# Patient Record
Sex: FEMALE | URBAN - METROPOLITAN AREA
[De-identification: names, ages, dates, MRNs, and addresses within clinical notes are randomized per-mention and may not be internally consistent; named-entity substitution may affect disease eponyms.]

---

## 2017-06-26 ENCOUNTER — APPOINTMENT (RX ONLY)
Age: 37
Setting detail: DERMATOLOGY
End: 2017-06-26

## 2017-06-26 DIAGNOSIS — L03.01 CELLULITIS OF FINGER: ICD-10-CM

## 2017-06-26 PROBLEM — L03.019 CELLULITIS OF UNSPECIFIED FINGER: Status: ACTIVE | Noted: 2017-06-26

## 2017-06-26 PROBLEM — L85.3 XEROSIS CUTIS: Status: ACTIVE | Noted: 2017-06-26

## 2017-06-26 PROCEDURE — ? ORDER TESTS

## 2017-06-26 PROCEDURE — 99212 OFFICE O/P EST SF 10 MIN: CPT

## 2017-06-26 PROCEDURE — ? COUNSELING

## 2017-06-26 PROCEDURE — ? PATIENT SPECIFIC COUNSELING

## 2017-06-26 NOTE — PROCEDURE: PATIENT SPECIFIC COUNSELING
while collecting fluid for the culture, I did drain quite a bit of creamy white fluid out
Detail Level: Zone

## 2017-10-26 ENCOUNTER — APPOINTMENT (RX ONLY)
Age: 37
Setting detail: DERMATOLOGY
End: 2017-10-26

## 2017-10-26 DIAGNOSIS — L70.0 ACNE VULGARIS: ICD-10-CM

## 2017-10-26 PROCEDURE — 99214 OFFICE O/P EST MOD 30 MIN: CPT

## 2017-10-26 PROCEDURE — ? DIAGNOSIS COMMENT

## 2017-10-26 PROCEDURE — ? PRESCRIPTION

## 2017-10-26 PROCEDURE — ? COUNSELING

## 2017-10-26 RX ORDER — AZELAIC ACID 0.15 G/G
GEL TOPICAL
Qty: 1 | Refills: 2 | Status: ERX | COMMUNITY
Start: 2017-10-26

## 2017-10-26 RX ORDER — CLINDAMYCIN PHOSPHATE 10 MG/ML
SOLUTION TOPICAL
Qty: 1 | Refills: 2 | Status: ERX | COMMUNITY
Start: 2017-10-26

## 2017-10-26 RX ORDER — TRETINOIN 0.25 MG/G
CREAM TOPICAL
Qty: 1 | Refills: 2 | Status: ERX | COMMUNITY
Start: 2017-10-26

## 2017-10-26 RX ADMIN — TRETINOIN: 0.25 CREAM TOPICAL at 22:34

## 2017-10-26 RX ADMIN — AZELAIC ACID: 0.15 GEL TOPICAL at 22:34

## 2017-10-26 RX ADMIN — CLINDAMYCIN PHOSPHATE: 10 SOLUTION TOPICAL at 22:34

## 2017-10-26 NOTE — PROCEDURE: DIAGNOSIS COMMENT
Comment: She has some comedonal and inflammatory acne on the face and neck. We discussed the diagnosis of acne at some length. We discussed cosmetic products and procedures, including Obaji products. She may consider seeing Dr. Norman in the future to further discuss Obaji if she desires. For now will start tretinoin, but she will consider the Obaji tretinoin if the generic is not covered. Will also restart Azelex and clindamycin. Discussed spironolactone, which I think would also be beneficial, but she would like to hold off for now. OK to use [employer] products as long as it doesn't cause too much skin irritation.
Detail Level: Zone

## 2018-11-06 ENCOUNTER — APPOINTMENT (RX ONLY)
Age: 38
Setting detail: DERMATOLOGY
End: 2018-11-06

## 2018-11-06 DIAGNOSIS — L70.0 ACNE VULGARIS: ICD-10-CM

## 2018-11-06 DIAGNOSIS — D22 MELANOCYTIC NEVI: ICD-10-CM

## 2018-11-06 DIAGNOSIS — L81.4 OTHER MELANIN HYPERPIGMENTATION: ICD-10-CM

## 2018-11-06 DIAGNOSIS — L72.0 EPIDERMAL CYST: ICD-10-CM

## 2018-11-06 PROBLEM — D22.4 MELANOCYTIC NEVI OF SCALP AND NECK: Status: ACTIVE | Noted: 2018-11-06

## 2018-11-06 PROBLEM — D23.5 OTHER BENIGN NEOPLASM OF SKIN OF TRUNK: Status: ACTIVE | Noted: 2018-11-06

## 2018-11-06 PROBLEM — D22.39 MELANOCYTIC NEVI OF OTHER PARTS OF FACE: Status: ACTIVE | Noted: 2018-11-06

## 2018-11-06 PROBLEM — J30.1 ALLERGIC RHINITIS DUE TO POLLEN: Status: ACTIVE | Noted: 2018-11-06

## 2018-11-06 PROBLEM — D23.72 OTHER BENIGN NEOPLASM OF SKIN OF LEFT LOWER LIMB, INCLUDING HIP: Status: ACTIVE | Noted: 2018-11-06

## 2018-11-06 PROCEDURE — ? BENIGN DESTRUCTION COSMETIC

## 2018-11-06 PROCEDURE — ? DIAGNOSIS COMMENT

## 2018-11-06 PROCEDURE — 99214 OFFICE O/P EST MOD 30 MIN: CPT

## 2018-11-06 PROCEDURE — ? COUNSELING

## 2018-11-06 ASSESSMENT — LOCATION ZONE DERM
LOCATION ZONE: FACE
LOCATION ZONE: SCALP
LOCATION ZONE: TRUNK

## 2018-11-06 ASSESSMENT — LOCATION SIMPLE DESCRIPTION DERM
LOCATION SIMPLE: RIGHT UPPER BACK
LOCATION SIMPLE: SCALP
LOCATION SIMPLE: RIGHT TEMPLE
LOCATION SIMPLE: LEFT EYEBROW

## 2018-11-06 ASSESSMENT — LOCATION DETAILED DESCRIPTION DERM
LOCATION DETAILED: RIGHT MID TEMPLE
LOCATION DETAILED: RIGHT CENTRAL POSTAURICULAR SKIN
LOCATION DETAILED: RIGHT SUPERIOR MEDIAL UPPER BACK
LOCATION DETAILED: LEFT MEDIAL EYEBROW

## 2018-11-06 NOTE — PROCEDURE: DIAGNOSIS COMMENT
Comment: Overall her acne is better, she is using La Roche-Posay products, containung BPO and AHA.  She reports that her flare today is due to stress and her period, but this is worse than normal. She will continue current care and notify clinic with flares.

## 2018-11-06 NOTE — PROCEDURE: DIAGNOSIS COMMENT
Comment: She also has a benign appearing nevus near the left inguinal crease. She will consider seeing a plastic surgeon to have some of the moles removed. She is also interested in discussing removal of some of DF's with a plastic surgeon.